# Patient Record
Sex: FEMALE | Race: WHITE | NOT HISPANIC OR LATINO | Employment: FULL TIME | URBAN - METROPOLITAN AREA
[De-identification: names, ages, dates, MRNs, and addresses within clinical notes are randomized per-mention and may not be internally consistent; named-entity substitution may affect disease eponyms.]

---

## 2020-02-07 ENCOUNTER — APPOINTMENT (OUTPATIENT)
Dept: LAB | Facility: HOSPITAL | Age: 50
End: 2020-02-07

## 2020-02-07 ENCOUNTER — TRANSCRIBE ORDERS (OUTPATIENT)
Dept: LAB | Facility: HOSPITAL | Age: 50
End: 2020-02-07

## 2020-02-07 DIAGNOSIS — N92.0 EXCESSIVE OR FREQUENT MENSTRUATION: Primary | ICD-10-CM

## 2020-02-07 LAB
APTT PPP: 32.4 SECONDS (ref 24–37)
BASOPHILS # BLD AUTO: 0.05 10*3/MM3 (ref 0–0.2)
BASOPHILS NFR BLD AUTO: 0.7 % (ref 0–1.5)
DEPRECATED RDW RBC AUTO: 46.3 FL (ref 37–54)
EOSINOPHIL # BLD AUTO: 0.38 10*3/MM3 (ref 0–0.4)
EOSINOPHIL NFR BLD AUTO: 5.5 % (ref 0.3–6.2)
ERYTHROCYTE [DISTWIDTH] IN BLOOD BY AUTOMATED COUNT: 13.5 % (ref 12.3–15.4)
HCT VFR BLD AUTO: 31.6 % (ref 34–46.6)
HGB BLD-MCNC: 10.2 G/DL (ref 12–15.9)
IMM GRANULOCYTES # BLD AUTO: 0.05 10*3/MM3 (ref 0–0.05)
IMM GRANULOCYTES NFR BLD AUTO: 0.7 % (ref 0–0.5)
INR PPP: 0.94 (ref 0.85–1.16)
LYMPHOCYTES # BLD AUTO: 1.65 10*3/MM3 (ref 0.7–3.1)
LYMPHOCYTES NFR BLD AUTO: 23.7 % (ref 19.6–45.3)
MCH RBC QN AUTO: 30.5 PG (ref 26.6–33)
MCHC RBC AUTO-ENTMCNC: 32.3 G/DL (ref 31.5–35.7)
MCV RBC AUTO: 94.6 FL (ref 79–97)
MONOCYTES # BLD AUTO: 0.5 10*3/MM3 (ref 0.1–0.9)
MONOCYTES NFR BLD AUTO: 7.2 % (ref 5–12)
NEUTROPHILS # BLD AUTO: 4.32 10*3/MM3 (ref 1.7–7)
NEUTROPHILS NFR BLD AUTO: 62.2 % (ref 42.7–76)
NRBC BLD AUTO-RTO: 0 /100 WBC (ref 0–0.2)
PLATELET # BLD AUTO: 231 10*3/MM3 (ref 140–450)
PMV BLD AUTO: 8.7 FL (ref 6–12)
PROTHROMBIN TIME: 12.1 SECONDS (ref 11.2–14.3)
RBC # BLD AUTO: 3.34 10*6/MM3 (ref 3.77–5.28)
WBC NRBC COR # BLD: 6.95 10*3/MM3 (ref 3.4–10.8)

## 2020-02-07 PROCEDURE — 85025 COMPLETE CBC W/AUTO DIFF WBC: CPT | Performed by: ADVANCED PRACTICE MIDWIFE

## 2020-02-07 PROCEDURE — 85610 PROTHROMBIN TIME: CPT | Performed by: ADVANCED PRACTICE MIDWIFE

## 2020-02-07 PROCEDURE — 36415 COLL VENOUS BLD VENIPUNCTURE: CPT | Performed by: ADVANCED PRACTICE MIDWIFE

## 2020-02-07 PROCEDURE — 85730 THROMBOPLASTIN TIME PARTIAL: CPT | Performed by: ADVANCED PRACTICE MIDWIFE

## 2020-11-05 ENCOUNTER — PREP FOR SURGERY (OUTPATIENT)
Dept: OTHER | Facility: HOSPITAL | Age: 50
End: 2020-11-05

## 2020-11-05 DIAGNOSIS — N92.0 MENORRHAGIA: Primary | ICD-10-CM

## 2020-11-05 PROBLEM — N92.1 MENOMETRORRHAGIA: Status: ACTIVE | Noted: 2020-11-05

## 2020-11-05 RX ORDER — CEFAZOLIN SODIUM 2 G/100ML
2 INJECTION, SOLUTION INTRAVENOUS ONCE
Status: CANCELLED | OUTPATIENT
Start: 2020-11-05 | End: 2020-11-05

## 2020-11-05 RX ORDER — HEPARIN SODIUM 5000 [USP'U]/ML
5000 INJECTION, SOLUTION INTRAVENOUS; SUBCUTANEOUS ONCE
Status: CANCELLED | OUTPATIENT
Start: 2020-11-05 | End: 2020-11-05

## 2020-11-05 RX ORDER — SCOLOPAMINE TRANSDERMAL SYSTEM 1 MG/1
1 PATCH, EXTENDED RELEASE TRANSDERMAL ONCE
Status: CANCELLED | OUTPATIENT
Start: 2020-11-05 | End: 2020-11-05

## 2020-11-05 NOTE — H&P (VIEW-ONLY)
Vital Signs  Weight:  200.4lb (90.9kg)  BMI: 35.63  Height: 63in  Temperature: 97.3 (tympanic)  Pulse: 76bpm  O2 sat: 99%    BP #1: 122/74       Position: sitting       Comment: left arm    Vitals performed by:  Danelle Holloway......................October 30, 2020 3:16 PM      Current Problems (Reviewed Today)  Preoperative examination (ICD-V72.84) (OKI32-M59.818)  Menometrorrhagia (ICD-626.2) (FPE63-E61.1)  Anxiety (ICD-300.00) (JNM99-P79.9)    Current Medications (Reviewed Today)  NAPROXEN 500 MG ORAL TABLET (NAPROXEN) Take one tablet PO BID for cramping; Route: ORAL  PROVERA 10 MG ORAL TABLET (MEDROXYPROGESTERONE ACETATE) Take one tablet TID for 7 days; Route: ORAL  JOLESSA 0.15-0.03 MG ORAL TABLET (LEVONORGEST-ETH ESTRAD 91-DAY) One tab po q d; Route: ORAL  IRON TABLET (FERROUS GLUCONATE TABS)   BUSPIRONE HCL 10 MG ORAL TABLET (BUSPIRONE HCL) ; Route: ORAL  PROPRANOLOL HCL 20 MG ORAL TABLET (PROPRANOLOL HCL) ; Route: ORAL  AMITRIPTYLINE HCL 10 MG ORAL TABLET (AMITRIPTYLINE HCL) ; Route: ORAL    Current Allergies (Reviewed Today)  PENICILLIN V POTASSIUM (PENICILLIN V POTASSIUM TABS), AMOXICILLIN (AMOXICILLIN TABS), * AMPICILLIN.        History of Present Illness  Visit Type: Pre op  Chief Complaint: Pre op     The patient presents with menstrual disorder. The patient complains of heavy periods, painful periods, bleeding between periods and periods closer together, but denies irregular periods, skipped or late periods and absent periods. Associated symptoms include passing clots, soiling clothing/bedding, bleeding limiting ADLs and fatigue. She denies bleeding after intercourse, shortness of breath, lightheadedness and acne. Interval between menses is 25-30 days. Number of pads used per day is > 10 and requires double protection. Menstrual flow lasts 7-10 days. Medications used include OCP/patch/ring. Significant history includes fibroids. Previous diagnostic workup includes ultrasound and endometrial biopsy.  Symptoms have been present for several years.       Past Medical History  --- Past Medical History reviewed   Anxiety  Fibroids  UTIs  Varicose Veins    Surgical History  ---- Surgical History reviewed    x2  Endometrial Ablation 20  Cholecystectomy       Family History  ---- Family History reviewed  Heart Disease (Other Family Member)  Hypertension (Other Family Member)    Gynecologic History  Menarche (yrs):  14  Usual Flow: Light  Duration (days): 2    Obstetrical History  : 2  Para: 2  Livin      Social History  --- Social History reviewed    Risk Factors  Tobacco use: Never smoker  Passive smoke exposure:  no    Alcohol use: no       Sun exposure:  occasionally    Genetic History  --- Genetic History reviewed  Patient Ethnicity: Not  or       Review of Systems    Constitutional: Denies fevers, chills, unintentional weight change.   Skin: Denies rash, suspicious lesions.   Eyes: Denies irritation, vision changes, eye pain.   ENT: Denies earache, nosebleeds, sore throat.   Breast: Denies tenderness, mass, skin changes.   Cardiovascular: Denies palpitations, chest pain, syncope.   Respiratory: Denies cough, dyspnea, wheezing.   GI: Denies nausea, vomiting, constipation.   : Complains of PELVIC PAIN, ABNORMAL BLEEDING, MENORRHAGIA.   Musculoskeletal: Denies back pain, joint pain, muscle weakness.   Neurological: Denies headache, loss of consciousness, numbness.   Psychological: Denies anxiety, depression, hallucinations.   Endocrine: Denies cold intolerance, heat intolerance, polyuria.   Heme/Lymphatic: Denies abnormal bruising, abnormal bleeding, enlarged lymph nodes.   Allergic/Immun: Denies urticaria, hay fever, persistent infection.     See HPI, All other systems reviewed and are negative.      Physical Exam    General Appearance: no acute distress, healthy appearing, well nourished, well developed.   Abdomen: nontender, nondistended, no guarding, no rebound, no  masses.   Vulva: normal appearance, no masses, no lesions.   Urethra: no masses, nontender, no discharge.   Bladder/Renal: nontender, no bladder distension.   Vagina: nontender, no erythema, no masses.   Cervix: normal, no motion tenderness, no lesions, no discharge.   Uterus: normal size, normal shape, nontender, mobile.   Adnexa: no masses, nontender.   Perineum: no masses, no lesions.   Anus/Rectum: normal perianal skin, no masses visible.   Lymphatic: no inguinal lymphadenopathy.   Psych: mood and affect appropriate, normal interaction, content and flow of speech normal.   Skin: no rashes, no lesions.         Assessment & Plan  - PREOPERATIVE EXAMINATION (DVO56-U83.818)  Plan: Exceeding half of the total visit time, 25 minutes spent with patient discussing possbile diagnosis and treatment options in addition to any other services that may have been provided today.    We will proceed with a TRH with a BSO    Services Completed Today  Pre Op Visit-63097 [[id number]]    Tests Ordered Today  No orders placed    Referrals Placed Today  No orders placed  Patient's diagnosis and possible treatment options were again reviewed, including lesser invasive options.  The risks, benefits and likely outcomes of each were reviewed.  The patient confirms her desire to proceed with TRH with BSO.  The procedure was again defined as robotic removal of uterus, cervix, fallopian tubes and ovaries.  Recognized risks of this procedure are those of any laparoscopic surgical procedure including anesthesia, bleeding, infection, damage to bowel or bladder and neurologic damage.  Specific risks of this procedure were also reviewed including ureteral damage, DVT and its sequelae, vaginal cuff hematoma or infection, wound dehiscence, ovarian remnants, as well as the general management of these complications.  She was informed of the possible need to convert the laparoscopic procedure to an open procedure after the induction of anesthesia.   Possible indications for post operative ERT and its risks and benefits were reviewed.  She was also advised that bleeding, bowel injury, bladder injury or ureteral injury may not be obvious at the time of surgery and that she would need to be viligent to call my office ASAP with any increasing abdominopelvic pain or any fever in the two weeks following surgery.  All of the patients questions were answered and she was advised to call with any other questions that she may have prior to the operation.        Electronically signed by Mindi Childs DO on 10/30/2020 at 4:12 PM    ________________________________________________________________________

## 2020-11-05 NOTE — H&P
Vital Signs  Weight:  200.4lb (90.9kg)  BMI: 35.63  Height: 63in  Temperature: 97.3 (tympanic)  Pulse: 76bpm  O2 sat: 99%    BP #1: 122/74       Position: sitting       Comment: left arm    Vitals performed by:  Danelle Holloway......................October 30, 2020 3:16 PM      Current Problems (Reviewed Today)  Preoperative examination (ICD-V72.84) (GNO12-D39.818)  Menometrorrhagia (ICD-626.2) (IUB13-K96.1)  Anxiety (ICD-300.00) (WAT50-B65.9)    Current Medications (Reviewed Today)  NAPROXEN 500 MG ORAL TABLET (NAPROXEN) Take one tablet PO BID for cramping; Route: ORAL  PROVERA 10 MG ORAL TABLET (MEDROXYPROGESTERONE ACETATE) Take one tablet TID for 7 days; Route: ORAL  JOLESSA 0.15-0.03 MG ORAL TABLET (LEVONORGEST-ETH ESTRAD 91-DAY) One tab po q d; Route: ORAL  IRON TABLET (FERROUS GLUCONATE TABS)   BUSPIRONE HCL 10 MG ORAL TABLET (BUSPIRONE HCL) ; Route: ORAL  PROPRANOLOL HCL 20 MG ORAL TABLET (PROPRANOLOL HCL) ; Route: ORAL  AMITRIPTYLINE HCL 10 MG ORAL TABLET (AMITRIPTYLINE HCL) ; Route: ORAL    Current Allergies (Reviewed Today)  PENICILLIN V POTASSIUM (PENICILLIN V POTASSIUM TABS), AMOXICILLIN (AMOXICILLIN TABS), * AMPICILLIN.        History of Present Illness  Visit Type: Pre op  Chief Complaint: Pre op     The patient presents with menstrual disorder. The patient complains of heavy periods, painful periods, bleeding between periods and periods closer together, but denies irregular periods, skipped or late periods and absent periods. Associated symptoms include passing clots, soiling clothing/bedding, bleeding limiting ADLs and fatigue. She denies bleeding after intercourse, shortness of breath, lightheadedness and acne. Interval between menses is 25-30 days. Number of pads used per day is > 10 and requires double protection. Menstrual flow lasts 7-10 days. Medications used include OCP/patch/ring. Significant history includes fibroids. Previous diagnostic workup includes ultrasound and endometrial biopsy.  Symptoms have been present for several years.       Past Medical History  --- Past Medical History reviewed   Anxiety  Fibroids  UTIs  Varicose Veins    Surgical History  ---- Surgical History reviewed    x2  Endometrial Ablation 20  Cholecystectomy       Family History  ---- Family History reviewed  Heart Disease (Other Family Member)  Hypertension (Other Family Member)    Gynecologic History  Menarche (yrs):  14  Usual Flow: Light  Duration (days): 2    Obstetrical History  : 2  Para: 2  Livin      Social History  --- Social History reviewed    Risk Factors  Tobacco use: Never smoker  Passive smoke exposure:  no    Alcohol use: no       Sun exposure:  occasionally    Genetic History  --- Genetic History reviewed  Patient Ethnicity: Not  or       Review of Systems    Constitutional: Denies fevers, chills, unintentional weight change.   Skin: Denies rash, suspicious lesions.   Eyes: Denies irritation, vision changes, eye pain.   ENT: Denies earache, nosebleeds, sore throat.   Breast: Denies tenderness, mass, skin changes.   Cardiovascular: Denies palpitations, chest pain, syncope.   Respiratory: Denies cough, dyspnea, wheezing.   GI: Denies nausea, vomiting, constipation.   : Complains of PELVIC PAIN, ABNORMAL BLEEDING, MENORRHAGIA.   Musculoskeletal: Denies back pain, joint pain, muscle weakness.   Neurological: Denies headache, loss of consciousness, numbness.   Psychological: Denies anxiety, depression, hallucinations.   Endocrine: Denies cold intolerance, heat intolerance, polyuria.   Heme/Lymphatic: Denies abnormal bruising, abnormal bleeding, enlarged lymph nodes.   Allergic/Immun: Denies urticaria, hay fever, persistent infection.     See HPI, All other systems reviewed and are negative.      Physical Exam    General Appearance: no acute distress, healthy appearing, well nourished, well developed.   Abdomen: nontender, nondistended, no guarding, no rebound, no  masses.   Vulva: normal appearance, no masses, no lesions.   Urethra: no masses, nontender, no discharge.   Bladder/Renal: nontender, no bladder distension.   Vagina: nontender, no erythema, no masses.   Cervix: normal, no motion tenderness, no lesions, no discharge.   Uterus: normal size, normal shape, nontender, mobile.   Adnexa: no masses, nontender.   Perineum: no masses, no lesions.   Anus/Rectum: normal perianal skin, no masses visible.   Lymphatic: no inguinal lymphadenopathy.   Psych: mood and affect appropriate, normal interaction, content and flow of speech normal.   Skin: no rashes, no lesions.         Assessment & Plan  - PREOPERATIVE EXAMINATION (WEV99-D65.818)  Plan: Exceeding half of the total visit time, 25 minutes spent with patient discussing possbile diagnosis and treatment options in addition to any other services that may have been provided today.    We will proceed with a TRH with a BSO    Services Completed Today  Pre Op Visit-57876 [[id number]]    Tests Ordered Today  No orders placed    Referrals Placed Today  No orders placed  Patient's diagnosis and possible treatment options were again reviewed, including lesser invasive options.  The risks, benefits and likely outcomes of each were reviewed.  The patient confirms her desire to proceed with TRH with BSO.  The procedure was again defined as robotic removal of uterus, cervix, fallopian tubes and ovaries.  Recognized risks of this procedure are those of any laparoscopic surgical procedure including anesthesia, bleeding, infection, damage to bowel or bladder and neurologic damage.  Specific risks of this procedure were also reviewed including ureteral damage, DVT and its sequelae, vaginal cuff hematoma or infection, wound dehiscence, ovarian remnants, as well as the general management of these complications.  She was informed of the possible need to convert the laparoscopic procedure to an open procedure after the induction of anesthesia.   Possible indications for post operative ERT and its risks and benefits were reviewed.  She was also advised that bleeding, bowel injury, bladder injury or ureteral injury may not be obvious at the time of surgery and that she would need to be viligent to call my office ASAP with any increasing abdominopelvic pain or any fever in the two weeks following surgery.  All of the patients questions were answered and she was advised to call with any other questions that she may have prior to the operation.        Electronically signed by Mindi Childs DO on 10/30/2020 at 4:12 PM    ________________________________________________________________________

## 2020-11-06 ENCOUNTER — APPOINTMENT (OUTPATIENT)
Dept: PREADMISSION TESTING | Facility: HOSPITAL | Age: 50
End: 2020-11-06

## 2020-11-06 ENCOUNTER — ANESTHESIA EVENT (OUTPATIENT)
Dept: PERIOP | Facility: HOSPITAL | Age: 50
End: 2020-11-06

## 2020-11-06 VITALS — HEIGHT: 62 IN | WEIGHT: 198.85 LBS | BODY MASS INDEX: 36.59 KG/M2

## 2020-11-06 DIAGNOSIS — N92.0 MENORRHAGIA: ICD-10-CM

## 2020-11-06 DIAGNOSIS — N92.1 MENORRHAGIA WITH IRREGULAR CYCLE: ICD-10-CM

## 2020-11-06 LAB
ABO GROUP BLD: NORMAL
B-HCG UR QL: NEGATIVE
BACTERIA UR QL AUTO: ABNORMAL /HPF
BILIRUB UR QL STRIP: NEGATIVE
BLD GP AB SCN SERPL QL: NEGATIVE
CLARITY UR: ABNORMAL
COLOR UR: YELLOW
DEPRECATED RDW RBC AUTO: 46 FL (ref 37–54)
ERYTHROCYTE [DISTWIDTH] IN BLOOD BY AUTOMATED COUNT: 13.1 % (ref 12.3–15.4)
GLUCOSE UR STRIP-MCNC: NEGATIVE MG/DL
HBA1C MFR BLD: 5.4 % (ref 4.8–5.6)
HCT VFR BLD AUTO: 43.5 % (ref 34–46.6)
HGB BLD-MCNC: 14.5 G/DL (ref 12–15.9)
HGB UR QL STRIP.AUTO: NEGATIVE
HYALINE CASTS UR QL AUTO: ABNORMAL /LPF
KETONES UR QL STRIP: NEGATIVE
LEUKOCYTE ESTERASE UR QL STRIP.AUTO: ABNORMAL
MCH RBC QN AUTO: 31.8 PG (ref 26.6–33)
MCHC RBC AUTO-ENTMCNC: 33.3 G/DL (ref 31.5–35.7)
MCV RBC AUTO: 95.4 FL (ref 79–97)
NITRITE UR QL STRIP: NEGATIVE
PH UR STRIP.AUTO: 6.5 [PH] (ref 5–8)
PLATELET # BLD AUTO: 199 10*3/MM3 (ref 140–450)
PMV BLD AUTO: 8.5 FL (ref 6–12)
POTASSIUM SERPL-SCNC: 4.2 MMOL/L (ref 3.5–5.2)
PROT UR QL STRIP: NEGATIVE
RBC # BLD AUTO: 4.56 10*6/MM3 (ref 3.77–5.28)
RBC # UR: ABNORMAL /HPF
REF LAB TEST METHOD: ABNORMAL
RH BLD: NEGATIVE
SP GR UR STRIP: 1.03 (ref 1–1.03)
SQUAMOUS #/AREA URNS HPF: ABNORMAL /HPF
T&S EXPIRATION DATE: NORMAL
UROBILINOGEN UR QL STRIP: ABNORMAL
WBC # BLD AUTO: 7.64 10*3/MM3 (ref 3.4–10.8)
WBC UR QL AUTO: ABNORMAL /HPF

## 2020-11-06 PROCEDURE — 86900 BLOOD TYPING SEROLOGIC ABO: CPT | Performed by: OBSTETRICS & GYNECOLOGY

## 2020-11-06 PROCEDURE — 84132 ASSAY OF SERUM POTASSIUM: CPT | Performed by: OBSTETRICS & GYNECOLOGY

## 2020-11-06 PROCEDURE — 81025 URINE PREGNANCY TEST: CPT | Performed by: OBSTETRICS & GYNECOLOGY

## 2020-11-06 PROCEDURE — 83036 HEMOGLOBIN GLYCOSYLATED A1C: CPT | Performed by: OBSTETRICS & GYNECOLOGY

## 2020-11-06 PROCEDURE — C9803 HOPD COVID-19 SPEC COLLECT: HCPCS

## 2020-11-06 PROCEDURE — 81001 URINALYSIS AUTO W/SCOPE: CPT | Performed by: OBSTETRICS & GYNECOLOGY

## 2020-11-06 PROCEDURE — 86901 BLOOD TYPING SEROLOGIC RH(D): CPT | Performed by: OBSTETRICS & GYNECOLOGY

## 2020-11-06 PROCEDURE — 36415 COLL VENOUS BLD VENIPUNCTURE: CPT

## 2020-11-06 PROCEDURE — 86850 RBC ANTIBODY SCREEN: CPT | Performed by: OBSTETRICS & GYNECOLOGY

## 2020-11-06 PROCEDURE — 85027 COMPLETE CBC AUTOMATED: CPT | Performed by: OBSTETRICS & GYNECOLOGY

## 2020-11-06 PROCEDURE — U0004 COV-19 TEST NON-CDC HGH THRU: HCPCS

## 2020-11-06 PROCEDURE — 87086 URINE CULTURE/COLONY COUNT: CPT | Performed by: OBSTETRICS & GYNECOLOGY

## 2020-11-06 PROCEDURE — 87186 SC STD MICRODIL/AGAR DIL: CPT | Performed by: OBSTETRICS & GYNECOLOGY

## 2020-11-06 PROCEDURE — 87088 URINE BACTERIA CULTURE: CPT | Performed by: OBSTETRICS & GYNECOLOGY

## 2020-11-06 RX ORDER — BUSPIRONE HYDROCHLORIDE 10 MG/1
10 TABLET ORAL 2 TIMES DAILY
COMMUNITY

## 2020-11-06 RX ORDER — FREMANEZUMAB-VFRM 225 MG/1.5ML
INJECTION SUBCUTANEOUS TAKE AS DIRECTED
COMMUNITY

## 2020-11-06 RX ORDER — LEVOCETIRIZINE DIHYDROCHLORIDE 5 MG/1
5 TABLET, FILM COATED ORAL EVERY EVENING
COMMUNITY

## 2020-11-06 RX ORDER — SUMATRIPTAN 100 MG/1
100 TABLET, FILM COATED ORAL EVERY 12 HOURS PRN
COMMUNITY

## 2020-11-06 RX ORDER — TOPIRAMATE 50 MG/1
50 TABLET, FILM COATED ORAL NIGHTLY
COMMUNITY

## 2020-11-06 RX ORDER — FAMOTIDINE 10 MG/ML
20 INJECTION, SOLUTION INTRAVENOUS ONCE
Status: CANCELLED | OUTPATIENT
Start: 2020-11-06 | End: 2020-11-06

## 2020-11-06 RX ORDER — PROPRANOLOL HYDROCHLORIDE 20 MG/1
20 TABLET ORAL 2 TIMES DAILY
COMMUNITY

## 2020-11-06 RX ORDER — DIPHENHYDRAMINE HCL 25 MG
25 TABLET ORAL EVERY 6 HOURS PRN
COMMUNITY

## 2020-11-06 NOTE — PAT
Patient to apply Chlorhexadine wipes  to surgical area (as instructed) the night before procedure and the AM of procedure. Wipes provided.  Patient instructed to drink 20 ounces (or until full) of Gatorade and it needs to be completed 1 hour before given arrival time for procedure (NO RED Gatorade)    Patient verbalized understanding.  Blood bank bracelet applied to patient during Pre Admission Testing visit.  Patient instructed not to remove from arm until after procedure and they are discharged from the hospital.  Explained to patient that they may shower and get the bracelet wet, but not to immerse under water for longer periods (bathing, swimming, hand dishwashing, etc).  Patient verbalized understanding.

## 2020-11-07 LAB — SARS-COV-2 RNA RESP QL NAA+PROBE: NOT DETECTED

## 2020-11-08 NOTE — PAT
Informed kem linares( on call for dr peña) about ua and ua culture result- no new orders at this time.

## 2020-11-09 ENCOUNTER — ANESTHESIA (OUTPATIENT)
Dept: PERIOP | Facility: HOSPITAL | Age: 50
End: 2020-11-09

## 2020-11-09 ENCOUNTER — HOSPITAL ENCOUNTER (OUTPATIENT)
Facility: HOSPITAL | Age: 50
Discharge: HOME | End: 2020-11-09
Attending: OBSTETRICS & GYNECOLOGY | Admitting: OBSTETRICS & GYNECOLOGY

## 2020-11-09 VITALS
OXYGEN SATURATION: 95 % | RESPIRATION RATE: 16 BRPM | TEMPERATURE: 97.9 F | HEART RATE: 58 BPM | DIASTOLIC BLOOD PRESSURE: 73 MMHG | SYSTOLIC BLOOD PRESSURE: 108 MMHG

## 2020-11-09 DIAGNOSIS — N92.1 MENOMETRORRHAGIA: ICD-10-CM

## 2020-11-09 DIAGNOSIS — N92.0 MENORRHAGIA: ICD-10-CM

## 2020-11-09 LAB
ABO GROUP BLD: NORMAL
B-HCG UR QL: NEGATIVE
BACTERIA SPEC AEROBE CULT: ABNORMAL
INTERNAL NEGATIVE CONTROL: NEGATIVE
INTERNAL POSITIVE CONTROL: POSITIVE
Lab: NORMAL
RH BLD: NEGATIVE

## 2020-11-09 PROCEDURE — 86901 BLOOD TYPING SEROLOGIC RH(D): CPT

## 2020-11-09 PROCEDURE — 25010000002 NEOSTIGMINE 10 MG/10ML SOLUTION: Performed by: NURSE ANESTHETIST, CERTIFIED REGISTERED

## 2020-11-09 PROCEDURE — 25010000002 KETOROLAC TROMETHAMINE PER 15 MG: Performed by: NURSE ANESTHETIST, CERTIFIED REGISTERED

## 2020-11-09 PROCEDURE — 25010000002 ONDANSETRON PER 1 MG: Performed by: NURSE ANESTHETIST, CERTIFIED REGISTERED

## 2020-11-09 PROCEDURE — 63710000001 PROMETHAZINE PER 12.5 MG: Performed by: OBSTETRICS & GYNECOLOGY

## 2020-11-09 PROCEDURE — 25010000002 PROPOFOL 10 MG/ML EMULSION: Performed by: NURSE ANESTHETIST, CERTIFIED REGISTERED

## 2020-11-09 PROCEDURE — 25010000002 DEXAMETHASONE PER 1 MG: Performed by: NURSE ANESTHETIST, CERTIFIED REGISTERED

## 2020-11-09 PROCEDURE — 58571 TLH W/T/O 250 G OR LESS: CPT | Performed by: PHYSICIAN ASSISTANT

## 2020-11-09 PROCEDURE — 81025 URINE PREGNANCY TEST: CPT | Performed by: ANESTHESIOLOGY

## 2020-11-09 PROCEDURE — 88307 TISSUE EXAM BY PATHOLOGIST: CPT | Performed by: OBSTETRICS & GYNECOLOGY

## 2020-11-09 PROCEDURE — 25010000002 FENTANYL CITRATE (PF) 100 MCG/2ML SOLUTION: Performed by: NURSE ANESTHETIST, CERTIFIED REGISTERED

## 2020-11-09 PROCEDURE — 25010000003 CEFAZOLIN IN DEXTROSE 2-4 GM/100ML-% SOLUTION: Performed by: OBSTETRICS & GYNECOLOGY

## 2020-11-09 PROCEDURE — 25010000003 LIDOCAINE 1 % SOLUTION: Performed by: NURSE ANESTHETIST, CERTIFIED REGISTERED

## 2020-11-09 PROCEDURE — 25010000002 HYDROMORPHONE PER 4 MG: Performed by: OBSTETRICS & GYNECOLOGY

## 2020-11-09 PROCEDURE — 25010000002 HEPARIN (PORCINE) PER 1000 UNITS: Performed by: OBSTETRICS & GYNECOLOGY

## 2020-11-09 PROCEDURE — 86900 BLOOD TYPING SEROLOGIC ABO: CPT

## 2020-11-09 DEVICE — DEV CONTRL TISS STRATAFIX SPIRAL PD V7 SH 2-0 1/2 30CM: Type: IMPLANTABLE DEVICE | Site: PELVIS | Status: FUNCTIONAL

## 2020-11-09 RX ORDER — PROPOFOL 10 MG/ML
VIAL (ML) INTRAVENOUS AS NEEDED
Status: DISCONTINUED | OUTPATIENT
Start: 2020-11-09 | End: 2020-11-09 | Stop reason: SURG

## 2020-11-09 RX ORDER — PROMETHAZINE HYDROCHLORIDE 25 MG/1
25 TABLET ORAL ONCE AS NEEDED
Status: DISCONTINUED | OUTPATIENT
Start: 2020-11-09 | End: 2020-11-09 | Stop reason: HOSPADM

## 2020-11-09 RX ORDER — GLYCOPYRROLATE 0.2 MG/ML
INJECTION INTRAMUSCULAR; INTRAVENOUS AS NEEDED
Status: DISCONTINUED | OUTPATIENT
Start: 2020-11-09 | End: 2020-11-09 | Stop reason: SURG

## 2020-11-09 RX ORDER — HEPARIN SODIUM 5000 [USP'U]/ML
INJECTION, SOLUTION INTRAVENOUS; SUBCUTANEOUS AS NEEDED
Status: DISCONTINUED | OUTPATIENT
Start: 2020-11-09 | End: 2020-11-09 | Stop reason: HOSPADM

## 2020-11-09 RX ORDER — HYDROCODONE BITARTRATE AND ACETAMINOPHEN 5; 325 MG/1; MG/1
1 TABLET ORAL EVERY 4 HOURS PRN
Status: DISCONTINUED | OUTPATIENT
Start: 2020-11-09 | End: 2020-11-09 | Stop reason: HOSPADM

## 2020-11-09 RX ORDER — LIDOCAINE HYDROCHLORIDE 10 MG/ML
INJECTION, SOLUTION INFILTRATION; PERINEURAL AS NEEDED
Status: DISCONTINUED | OUTPATIENT
Start: 2020-11-09 | End: 2020-11-09 | Stop reason: SURG

## 2020-11-09 RX ORDER — ACETAMINOPHEN 500 MG
1000 TABLET ORAL EVERY 8 HOURS SCHEDULED
Status: DISCONTINUED | OUTPATIENT
Start: 2020-11-09 | End: 2020-11-09 | Stop reason: HOSPADM

## 2020-11-09 RX ORDER — PROMETHAZINE HYDROCHLORIDE 12.5 MG/1
12.5 TABLET ORAL EVERY 6 HOURS PRN
Status: DISCONTINUED | OUTPATIENT
Start: 2020-11-09 | End: 2020-11-09 | Stop reason: HOSPADM

## 2020-11-09 RX ORDER — DEXAMETHASONE SODIUM PHOSPHATE 4 MG/ML
INJECTION, SOLUTION INTRA-ARTICULAR; INTRALESIONAL; INTRAMUSCULAR; INTRAVENOUS; SOFT TISSUE AS NEEDED
Status: DISCONTINUED | OUTPATIENT
Start: 2020-11-09 | End: 2020-11-09 | Stop reason: SURG

## 2020-11-09 RX ORDER — ACETAMINOPHEN 500 MG
1000 TABLET ORAL EVERY 8 HOURS
Status: DISCONTINUED | OUTPATIENT
Start: 2020-11-09 | End: 2020-11-09

## 2020-11-09 RX ORDER — ONDANSETRON 2 MG/ML
4 INJECTION INTRAMUSCULAR; INTRAVENOUS EVERY 6 HOURS PRN
Status: DISCONTINUED | OUTPATIENT
Start: 2020-11-09 | End: 2020-11-09 | Stop reason: HOSPADM

## 2020-11-09 RX ORDER — HEPARIN SODIUM 5000 [USP'U]/ML
5000 INJECTION, SOLUTION INTRAVENOUS; SUBCUTANEOUS ONCE
Status: DISCONTINUED | OUTPATIENT
Start: 2020-11-09 | End: 2020-11-09 | Stop reason: HOSPADM

## 2020-11-09 RX ORDER — CIPROFLOXACIN 500 MG/1
500 TABLET, FILM COATED ORAL 2 TIMES DAILY
Qty: 10 TABLET | Refills: 0 | Status: SHIPPED | OUTPATIENT
Start: 2020-11-09 | End: 2020-11-14

## 2020-11-09 RX ORDER — PROMETHAZINE HYDROCHLORIDE 25 MG/1
25 SUPPOSITORY RECTAL ONCE AS NEEDED
Status: DISCONTINUED | OUTPATIENT
Start: 2020-11-09 | End: 2020-11-09 | Stop reason: HOSPADM

## 2020-11-09 RX ORDER — BUPIVACAINE HYDROCHLORIDE AND EPINEPHRINE 5; 5 MG/ML; UG/ML
INJECTION, SOLUTION PERINEURAL AS NEEDED
Status: DISCONTINUED | OUTPATIENT
Start: 2020-11-09 | End: 2020-11-09 | Stop reason: HOSPADM

## 2020-11-09 RX ORDER — SIMETHICONE 80 MG
80 TABLET,CHEWABLE ORAL 4 TIMES DAILY PRN
Status: DISCONTINUED | OUTPATIENT
Start: 2020-11-09 | End: 2020-11-09 | Stop reason: HOSPADM

## 2020-11-09 RX ORDER — CEFAZOLIN SODIUM 2 G/100ML
2 INJECTION, SOLUTION INTRAVENOUS ONCE
Status: COMPLETED | OUTPATIENT
Start: 2020-11-09 | End: 2020-11-09

## 2020-11-09 RX ORDER — ACETAMINOPHEN 650 MG/1
650 SUPPOSITORY RECTAL EVERY 4 HOURS PRN
Status: DISCONTINUED | OUTPATIENT
Start: 2020-11-09 | End: 2020-11-09 | Stop reason: SDUPTHER

## 2020-11-09 RX ORDER — ALUMINA, MAGNESIA, AND SIMETHICONE 2400; 2400; 240 MG/30ML; MG/30ML; MG/30ML
15 SUSPENSION ORAL EVERY 6 HOURS PRN
Status: DISCONTINUED | OUTPATIENT
Start: 2020-11-09 | End: 2020-11-09 | Stop reason: HOSPADM

## 2020-11-09 RX ORDER — SODIUM CHLORIDE, SODIUM LACTATE, POTASSIUM CHLORIDE, CALCIUM CHLORIDE 600; 310; 30; 20 MG/100ML; MG/100ML; MG/100ML; MG/100ML
9 INJECTION, SOLUTION INTRAVENOUS CONTINUOUS
Status: DISCONTINUED | OUTPATIENT
Start: 2020-11-09 | End: 2020-11-09 | Stop reason: HOSPADM

## 2020-11-09 RX ORDER — ONDANSETRON 4 MG/1
4 TABLET, FILM COATED ORAL EVERY 6 HOURS PRN
Status: DISCONTINUED | OUTPATIENT
Start: 2020-11-09 | End: 2020-11-09 | Stop reason: HOSPADM

## 2020-11-09 RX ORDER — LIDOCAINE HYDROCHLORIDE 10 MG/ML
0.5 INJECTION, SOLUTION EPIDURAL; INFILTRATION; INTRACAUDAL; PERINEURAL ONCE AS NEEDED
Status: COMPLETED | OUTPATIENT
Start: 2020-11-09 | End: 2020-11-09

## 2020-11-09 RX ORDER — SODIUM CHLORIDE 9 MG/ML
INJECTION, SOLUTION INTRAVENOUS AS NEEDED
Status: DISCONTINUED | OUTPATIENT
Start: 2020-11-09 | End: 2020-11-09 | Stop reason: HOSPADM

## 2020-11-09 RX ORDER — FAMOTIDINE 20 MG/1
20 TABLET, FILM COATED ORAL ONCE
Status: COMPLETED | OUTPATIENT
Start: 2020-11-09 | End: 2020-11-09

## 2020-11-09 RX ORDER — ONDANSETRON 2 MG/ML
4 INJECTION INTRAMUSCULAR; INTRAVENOUS ONCE AS NEEDED
Status: COMPLETED | OUTPATIENT
Start: 2020-11-09 | End: 2020-11-09

## 2020-11-09 RX ORDER — HYDROMORPHONE HYDROCHLORIDE 1 MG/ML
0.5 INJECTION, SOLUTION INTRAMUSCULAR; INTRAVENOUS; SUBCUTANEOUS
Status: DISCONTINUED | OUTPATIENT
Start: 2020-11-09 | End: 2020-11-09 | Stop reason: HOSPADM

## 2020-11-09 RX ORDER — ONDANSETRON 2 MG/ML
INJECTION INTRAMUSCULAR; INTRAVENOUS AS NEEDED
Status: DISCONTINUED | OUTPATIENT
Start: 2020-11-09 | End: 2020-11-09 | Stop reason: SURG

## 2020-11-09 RX ORDER — NEOSTIGMINE METHYLSULFATE 1 MG/ML
INJECTION, SOLUTION INTRAVENOUS AS NEEDED
Status: DISCONTINUED | OUTPATIENT
Start: 2020-11-09 | End: 2020-11-09 | Stop reason: SURG

## 2020-11-09 RX ORDER — HYDROCODONE BITARTRATE AND ACETAMINOPHEN 5; 325 MG/1; MG/1
1 TABLET ORAL EVERY 6 HOURS PRN
Qty: 10 TABLET | Refills: 0 | Status: SHIPPED | OUTPATIENT
Start: 2020-11-09 | End: 2020-11-19

## 2020-11-09 RX ORDER — IBUPROFEN 800 MG/1
800 TABLET ORAL EVERY 6 HOURS PRN
Qty: 30 TABLET | Refills: 1 | Status: SHIPPED | OUTPATIENT
Start: 2020-11-09 | End: 2020-12-09

## 2020-11-09 RX ORDER — MAGNESIUM HYDROXIDE 1200 MG/15ML
LIQUID ORAL AS NEEDED
Status: DISCONTINUED | OUTPATIENT
Start: 2020-11-09 | End: 2020-11-09 | Stop reason: HOSPADM

## 2020-11-09 RX ORDER — SODIUM CHLORIDE, SODIUM LACTATE, POTASSIUM CHLORIDE, CALCIUM CHLORIDE 600; 310; 30; 20 MG/100ML; MG/100ML; MG/100ML; MG/100ML
INJECTION, SOLUTION INTRAVENOUS CONTINUOUS PRN
Status: DISCONTINUED | OUTPATIENT
Start: 2020-11-09 | End: 2020-11-09 | Stop reason: SURG

## 2020-11-09 RX ORDER — ACETAMINOPHEN 160 MG/5ML
650 SOLUTION ORAL EVERY 4 HOURS PRN
Status: DISCONTINUED | OUTPATIENT
Start: 2020-11-09 | End: 2020-11-09 | Stop reason: SDUPTHER

## 2020-11-09 RX ORDER — SODIUM CHLORIDE 0.9 % (FLUSH) 0.9 %
10 SYRINGE (ML) INJECTION EVERY 12 HOURS SCHEDULED
Status: DISCONTINUED | OUTPATIENT
Start: 2020-11-09 | End: 2020-11-09 | Stop reason: HOSPADM

## 2020-11-09 RX ORDER — PROMETHAZINE HYDROCHLORIDE 12.5 MG/1
12.5 SUPPOSITORY RECTAL EVERY 6 HOURS PRN
Status: DISCONTINUED | OUTPATIENT
Start: 2020-11-09 | End: 2020-11-09 | Stop reason: HOSPADM

## 2020-11-09 RX ORDER — NALOXONE HCL 0.4 MG/ML
0.4 VIAL (ML) INJECTION
Status: DISCONTINUED | OUTPATIENT
Start: 2020-11-09 | End: 2020-11-09 | Stop reason: HOSPADM

## 2020-11-09 RX ORDER — ESTRADIOL 1 MG/1
1 TABLET ORAL DAILY
Qty: 90 TABLET | Refills: 3 | Status: SHIPPED | OUTPATIENT
Start: 2020-11-09

## 2020-11-09 RX ORDER — KETOROLAC TROMETHAMINE 30 MG/ML
INJECTION, SOLUTION INTRAMUSCULAR; INTRAVENOUS AS NEEDED
Status: DISCONTINUED | OUTPATIENT
Start: 2020-11-09 | End: 2020-11-09 | Stop reason: SURG

## 2020-11-09 RX ORDER — FENTANYL CITRATE 50 UG/ML
INJECTION, SOLUTION INTRAMUSCULAR; INTRAVENOUS AS NEEDED
Status: DISCONTINUED | OUTPATIENT
Start: 2020-11-09 | End: 2020-11-09 | Stop reason: SURG

## 2020-11-09 RX ORDER — ROCURONIUM BROMIDE 10 MG/ML
INJECTION, SOLUTION INTRAVENOUS AS NEEDED
Status: DISCONTINUED | OUTPATIENT
Start: 2020-11-09 | End: 2020-11-09 | Stop reason: SURG

## 2020-11-09 RX ORDER — FENTANYL CITRATE 50 UG/ML
50 INJECTION, SOLUTION INTRAMUSCULAR; INTRAVENOUS
Status: DISCONTINUED | OUTPATIENT
Start: 2020-11-09 | End: 2020-11-09 | Stop reason: HOSPADM

## 2020-11-09 RX ORDER — SODIUM CHLORIDE 0.9 % (FLUSH) 0.9 %
10 SYRINGE (ML) INJECTION AS NEEDED
Status: DISCONTINUED | OUTPATIENT
Start: 2020-11-09 | End: 2020-11-09 | Stop reason: HOSPADM

## 2020-11-09 RX ORDER — IBUPROFEN 400 MG/1
400 TABLET ORAL EVERY 6 HOURS PRN
Status: DISCONTINUED | OUTPATIENT
Start: 2020-11-09 | End: 2020-11-09 | Stop reason: HOSPADM

## 2020-11-09 RX ORDER — SCOLOPAMINE TRANSDERMAL SYSTEM 1 MG/1
1 PATCH, EXTENDED RELEASE TRANSDERMAL ONCE
Status: DISCONTINUED | OUTPATIENT
Start: 2020-11-09 | End: 2020-11-09 | Stop reason: HOSPADM

## 2020-11-09 RX ADMIN — CEFAZOLIN SODIUM 2 G: 2 INJECTION, SOLUTION INTRAVENOUS at 09:38

## 2020-11-09 RX ADMIN — ROCURONIUM BROMIDE 40 MG: 10 INJECTION INTRAVENOUS at 09:42

## 2020-11-09 RX ADMIN — LIDOCAINE HYDROCHLORIDE 50 MG: 10 INJECTION, SOLUTION INFILTRATION; PERINEURAL at 09:42

## 2020-11-09 RX ADMIN — LIDOCAINE HYDROCHLORIDE 0.2 ML: 10 INJECTION, SOLUTION EPIDURAL; INFILTRATION; INTRACAUDAL; PERINEURAL at 08:48

## 2020-11-09 RX ADMIN — PROPOFOL 25 MCG/KG/MIN: 10 INJECTION, EMULSION INTRAVENOUS at 09:50

## 2020-11-09 RX ADMIN — SODIUM CHLORIDE, POTASSIUM CHLORIDE, SODIUM LACTATE AND CALCIUM CHLORIDE 9 ML/HR: 600; 310; 30; 20 INJECTION, SOLUTION INTRAVENOUS at 08:48

## 2020-11-09 RX ADMIN — SODIUM CHLORIDE, POTASSIUM CHLORIDE, SODIUM LACTATE AND CALCIUM CHLORIDE: 600; 310; 30; 20 INJECTION, SOLUTION INTRAVENOUS at 09:38

## 2020-11-09 RX ADMIN — ONDANSETRON 4 MG: 2 INJECTION INTRAMUSCULAR; INTRAVENOUS at 11:13

## 2020-11-09 RX ADMIN — PROPOFOL 200 MG: 10 INJECTION, EMULSION INTRAVENOUS at 09:42

## 2020-11-09 RX ADMIN — FAMOTIDINE 20 MG: 20 TABLET, FILM COATED ORAL at 08:56

## 2020-11-09 RX ADMIN — ONDANSETRON 4 MG: 2 INJECTION INTRAMUSCULAR; INTRAVENOUS at 12:34

## 2020-11-09 RX ADMIN — FENTANYL CITRATE 50 MCG: 0.05 INJECTION, SOLUTION INTRAMUSCULAR; INTRAVENOUS at 12:31

## 2020-11-09 RX ADMIN — GLYCOPYRROLATE 0.6 MG: 0.2 INJECTION INTRAMUSCULAR; INTRAVENOUS at 11:15

## 2020-11-09 RX ADMIN — KETOROLAC TROMETHAMINE 30 MG: 30 INJECTION, SOLUTION INTRAMUSCULAR at 11:13

## 2020-11-09 RX ADMIN — HYDROMORPHONE HYDROCHLORIDE 0.5 MG: 1 INJECTION, SOLUTION INTRAMUSCULAR; INTRAVENOUS; SUBCUTANEOUS at 13:28

## 2020-11-09 RX ADMIN — DEXAMETHASONE SODIUM PHOSPHATE 8 MG: 4 INJECTION, SOLUTION INTRAMUSCULAR; INTRAVENOUS at 09:52

## 2020-11-09 RX ADMIN — SODIUM CHLORIDE, POTASSIUM CHLORIDE, SODIUM LACTATE AND CALCIUM CHLORIDE: 600; 310; 30; 20 INJECTION, SOLUTION INTRAVENOUS at 11:08

## 2020-11-09 RX ADMIN — NEOSTIGMINE 4 MG: 1 INJECTION INTRAVENOUS at 11:15

## 2020-11-09 RX ADMIN — SCOPALAMINE 1 PATCH: 1 PATCH, EXTENDED RELEASE TRANSDERMAL at 08:56

## 2020-11-09 RX ADMIN — FENTANYL CITRATE 100 MCG: 50 INJECTION, SOLUTION INTRAMUSCULAR; INTRAVENOUS at 09:38

## 2020-11-09 RX ADMIN — ROCURONIUM BROMIDE 20 MG: 10 INJECTION INTRAVENOUS at 10:09

## 2020-11-09 RX ADMIN — FENTANYL CITRATE 50 MCG: 0.05 INJECTION, SOLUTION INTRAMUSCULAR; INTRAVENOUS at 12:05

## 2020-11-09 RX ADMIN — GLYCOPYRROLATE 0.2 MG: 0.2 INJECTION INTRAMUSCULAR; INTRAVENOUS at 10:17

## 2020-11-09 RX ADMIN — PROMETHAZINE HYDROCHLORIDE 12.5 MG: 12.5 TABLET ORAL at 13:28

## 2020-11-09 NOTE — ANESTHESIA PREPROCEDURE EVALUATION
Anesthesia Evaluation     Patient summary reviewed and Nursing notes reviewed   NPO Solid Status: > 8 hours  NPO Liquid Status: > 2 hours           Airway   Mallampati: II  TM distance: >3 FB  Neck ROM: full  No difficulty expected  Dental      Pulmonary    (-) COPD, shortness of breath, recent URI, not a smoker  Cardiovascular     Patient on routine beta blocker    (-) past MI, dysrhythmias, angina      Neuro/Psych  (-) CVA  GI/Hepatic/Renal/Endo    (-) diabetes    Musculoskeletal     Abdominal    Substance History      OB/GYN          Other   arthritis,                      Anesthesia Plan    ASA 1     general   (Propofol infusion as part of for anti PONV tech)  intravenous induction     Anesthetic plan, all risks, benefits, and alternatives have been provided, discussed and informed consent has been obtained with: patient.    Plan discussed with CRNA.

## 2020-11-09 NOTE — OP NOTE
Total Robotic Hysterectomy with BSO Operative Dictation Note    Date of Service:  11/09/20 11:31 EST    Pre-operative diagnosis(es): Menorrhagia  Pelvic Pain         Post-operative diagnosis(es): Same plus Fibroid Uterus       Procedure(s): Total Robotic Hysterectomy with BSO     Surgeon:  Assistant: ROSINA Prieto     Anesthesia: General     IV Fluid: 1000 mL       Output: Est. Blood Loss Minimal  Urine Output 400 mL         Specimens removed: Specimens     ID Source Type Tests Collected By Collected At Frozen?      A Uterus with Cervix, Bilateral Tubes and Ovaries Tissue · TISSUE PATHOLOGY EXAM   Mindi Childs DO 11/9/20 1042      This specimen was not marked as sent.           Complications: None       Intraoperative consult(s):      Condition: stable       Disposition: To PACU and then to home       Operative Findings: Large fibroid uterus    Description of Procedure:    After informed consent was obtained, the patient was placed in the dorsal lithotomy position and prepared and draped in the normal  sterile fashion.  Bimanual exam revealed a normal size uterus with no adnexal fullness appreciated.  A weighted speculum was placed in the patient's vagina. The cervix was grasped with a single-tooth tenaculum. The manipulator was then inserted into the cervix.  The tenaculum and the weighted vaginal speculum were than removed.    Attention was then turned to the abdomen where the skin under the umbilicus was anesthetized with 0.25% marcaine.  This was approximately 1 cm below the umbilicus. A 8 mm incision was than made with the knife.  Blunt dissection was carried to the fascia with the hemostat.  The Veress needle was then inserted into the abdomen. Placement was confirmed with the water drop and air drawback tests. The abdomen was than insufflated with CO2 gas. Upon achievement of adequate pneumoperitoneum, the Versus needle was withdrawn and a 8 mm disposal and sheath were then advanced in the  abdomen.  The trocar was removed and the camera was advanced down the sheath. Two more trocars were placed on the right hand side and one more trocar was placed on the left hand side. These were all 8 mm ports. These were all placed under direct visualization.    The patient was placed in steep Trendelenburg. The robot was docked.  I then sat at the console.  Using a [place] bipolar, I cauterized the infundibulopelvic ligament on the right-hand side. It was then incised. I continued down the round ligament on that side with the cautery. The broad ligament was then opened up.  The anterior leaf was formed and a bladder flap was created on that side to the midline. The posterior leaf was then incised down to the uterosacral ligament on that side. The uterine artery was seen, grasped and was cauterized in multiple places and dissected off the uterus.    Attention was then turned to the left-hand side where the same technique was utilized. The infundibulopelvic and round ligaments were cauterized and incised. The broad ligament was then opened.  The anterior leaf was then brought to meet the previous incisions. The posterior leaf was dissected to the uterosacral ligament.  The uterine artery was than grasped on that side, cauterized in multiple places and dissected off the uterus.  The bladder was then dissected further off the cervix.  Monopolar anibal were then used to enter the posterior aspect of the vagina.  This was brought around laterally and superiorly. Using a tenaculum, the uterus, cervix, tubes and ovaries were removed.    The vaginal cuff was then closed with two figure of eight stitches of 2-0 PDS on each side of the vaginal cuff. The remainder of the cuff was closed with a V-Lock 2-0 Polyglyconate  Suture.  There was no bleeding noted from the vaginal cuff. The ureters were identified bilaterally and followed to the bladder.  Good periostalis was seen bilaterally.      The robot was then undocked. The  trocars were removed. The skin incisions were closed with 4-0 vicryl.    All instruments were then removed from the patients abdomen and vagina, Sponge, needle and lap counts were correct X 2.  The patient was taken to recovery room in stable condition. The patient tolerated the procedure well.              Mindi Childs DO  11/09/20  11:31 EST

## 2020-11-09 NOTE — INTERVAL H&P NOTE
[facility] Pre-op    Full history and physical note from office is attached.    OF note she was found to have an Ecoli UTI on PAT; she denies treatment.    /79 (BP Location: Right arm, Patient Position: Lying)   Pulse 66   Temp 98.9 °F (37.2 °C) (Temporal)   Resp 18   LMP 06/09/2020 Comment: ABLATION 1/2020  SpO2 98%     Immunizations:  Influenza:  No  Pneumococcal:  No  Tetanus:  Yes    LAB Results:  Lab Results   Component Value Date    WBC 7.64 11/06/2020    HGB 14.5 11/06/2020    HCT 43.5 11/06/2020    MCV 95.4 11/06/2020     11/06/2020    NEUTROABS 4.32 02/07/2020    K 4.2 11/06/2020    PTT 32.4 02/07/2020    INR 0.94 02/07/2020     Contains abnormal data Urine Culture - Urine, Urine, Clean Catch  Order: [id number] - Reflex for Order [id number]  Status:  Preliminary result   Visible to patient:  No (not released) Next appt:  None Dx:  Menorrhagia  Specimen Information: Urine, Clean Catch        Urine Culture >100,000 CFU/mL Escherichia coliAbnormal               Cancer Staging (if applicable)  Cancer Patient: __ yes __no __unknown__N/A; If yes, clinical stage T:__ N:__M:__, stage group or __N/A      Impression: Menometrorrhagia      Plan: TOTAL LAPAROSCOPIC HYSTERECTOMY BILATERAL SALPINGOOPHORECTOMY WITH DAVINCI ROBOT      KEZIA Rivas   11/9/2020   08:59 EST

## 2020-11-09 NOTE — ANESTHESIA POSTPROCEDURE EVALUATION
Patient: Yudith Hamm    Procedure Summary     Date: 11/09/20 Room / Location:  [facility]  [facility]    Anesthesia Start: 0938 Anesthesia Stop: 1131    Procedure: TOTAL LAPAROSCOPIC HYSTERECTOMY BILATERAL SALPINGOOPHORECTOMY WITH DAVINCI ROBOT (Bilateral Abdomen) Diagnosis:     Surgeon: Mindi Childs DO Provider: Naseem Mora MD    Anesthesia Type: general ASA Status: 1          Anesthesia Type: general    Vitals  Vitals Value Taken Time   BP 97/64 11/09/20 1130   Temp 97.7 °F (36.5 °C) 11/09/20 1130   Pulse 58 11/09/20 1130   Resp 18 11/09/20 1130   SpO2 96 % 11/09/20 1130           Post Anesthesia Care and Evaluation    Patient location during evaluation: PACU  Patient participation: waiting for patient participation  Level of consciousness: sleepy but conscious  Pain score: 0  Pain management: adequate  Airway patency: patent  Anesthetic complications: No anesthetic complications  PONV Status: none  Cardiovascular status: hemodynamically stable and acceptable  Respiratory status: nonlabored ventilation, acceptable, nasal cannula and oral airway  Hydration status: acceptable

## 2020-11-09 NOTE — PLAN OF CARE
Goal Outcome Evaluation:  Plan of Care Reviewed With: patient  Progress: improving  Outcome Summary: VSS, room air. Pain and nausea managed with PRNs. Pt voiding, ambulating, and tolerating PO. Discharge education completed with spouse at bedside.

## 2020-11-09 NOTE — ANESTHESIA PROCEDURE NOTES
Airway  Urgency: elective    Date/Time: 11/9/2020 9:46 AM  Airway not difficult    General Information and Staff    Patient location during procedure: OR  CRNA: Shannon Mendes CRNA    Indications and Patient Condition  Indications for airway management: airway protection    Preoxygenated: yes  MILS not maintained throughout  Mask difficulty assessment: 1 - vent by mask    Final Airway Details  Final airway type: endotracheal airway      Successful airway: ETT  Cuffed: yes   Successful intubation technique: direct laryngoscopy  Endotracheal tube insertion site: oral  Blade: Albarran  Blade size: 2  ETT size (mm): 7.0  Cormack-Lehane Classification: grade I - full view of glottis  Placement verified by: chest auscultation and capnometry   Measured from: lips  ETT/EBT  to lips (cm): 20  Number of attempts at approach: 1  Assessment: lips, teeth, and gum same as pre-op and atraumatic intubation    Additional Comments  Negative epigastric sounds, Breath sound equal bilaterally with symmetric chest rise and fall

## 2020-11-10 LAB
CYTO UR: NORMAL
LAB AP CASE REPORT: NORMAL
LAB AP CLINICAL INFORMATION: NORMAL
PATH REPORT.FINAL DX SPEC: NORMAL
PATH REPORT.GROSS SPEC: NORMAL

## (undated) DEVICE — BLADELESS OBTURATOR: Brand: WECK VISTA

## (undated) DEVICE — COLUMN DRAPE

## (undated) DEVICE — CVR HNDL LIGHT RIGID

## (undated) DEVICE — SYR LL TP 10ML STRL

## (undated) DEVICE — ARM DRAPE

## (undated) DEVICE — KT POSTN TRENDELENBURG NBXL PAD WING STRAP TABL HDRST XLG

## (undated) DEVICE — CANNULA SEAL

## (undated) DEVICE — GLV SURG SENSICARE PI MIC PF SZ6.5 LF STRL

## (undated) DEVICE — SUT MNCRYL PLS ANTIB UD 3/0 PS2 27IN

## (undated) DEVICE — C-ARM: Brand: UNBRANDED

## (undated) DEVICE — SKIN AFFIX SURG ADHESIVE 72/CS 0.55ML: Brand: MEDLINE

## (undated) DEVICE — DRAPE,TOP,102X53,STERILE: Brand: MEDLINE

## (undated) DEVICE — SUT PDS 2/0 CT2 27IN VIL PDP333H

## (undated) DEVICE — APPL CHLORAPREP TINTED 26ML TEAL

## (undated) DEVICE — MANIP UTER ADVINCULA DELINEATOR 3.5CM

## (undated) DEVICE — TIP COVER ACCESSORY

## (undated) DEVICE — PK MAJ GYN DAVINCI 10

## (undated) DEVICE — OCCL COLPO PNEUMO  STRL

## (undated) DEVICE — ST TBG CONN PNEUMOCLEAR EVAC SMOKE HEAT/HUMID